# Patient Record
Sex: MALE | Race: WHITE | Employment: OTHER | URBAN - METROPOLITAN AREA
[De-identification: names, ages, dates, MRNs, and addresses within clinical notes are randomized per-mention and may not be internally consistent; named-entity substitution may affect disease eponyms.]

---

## 2017-01-04 NOTE — PATIENT DISCUSSION
Myopia Counseling: The diagnosis of myopia (nearsightedness) was discussed with the patient. I explained to the patient that people who are nearsighted may be at an increased risk of a retinal detachment. Possible symptoms of retinal detachment including new onset of significant floaters or flashes or a sudden decrease in vision were reviewed with the patient. The patient understands that any of these symptoms require an immediate call to the office for an examination that day. Options for the correction of the patient's myopia discussed may include glasses, contacts or elective refractive surgery. Return for follow-up as scheduled.

## 2017-01-04 NOTE — PATIENT DISCUSSION
Vitreous Floaters Counseling: I have discussed the diagnosis of vitreous floaters with the patient and the possibility of a posterior vitreous detachment (PVD), retinal tear or detachment. The signs/symptoms of a PVD, retinal tear or detachment were reviewed to include but not limited to redness, discharge, pain, increase or change in flashes of light, increase or change in floaters, decreased vision, part of the vision missing, veils or any other ocular concerns. I have further explained not all patients who develop a tear or detachment have notable symptoms, therefore, compliance with return visits are necessary. Return for follow-up as scheduled.

## 2017-01-04 NOTE — PATIENT DISCUSSION
*Dry Eye Syndrome Counseling: I have discussed the diagnosis and the pathophysiology of this disease with the patient. Vision may be limited by dry eye, and symptoms exacerbated by environmental factors such as smoke, wind, or prolonged eye use. Treatment options include, but are not limited to, artificial tears, punctal plugs, topical cyclosporine, oral omega-3 supplements, Lipiflow, moisture goggles, and lubricating ointments. I stressed the importance of compliance with treatment.

## 2017-01-04 NOTE — PATIENT DISCUSSION
*Glaucoma Suspect Counseling:  I have explained to the patient at length glaucoma potentially causes loss of peripheral vision due to damage to the optic nerve. I have explained that damage to the optic nerve from glaucoma is irreversible and that the available treatments for glaucoma are designed to prevent further damage if we determine glaucoma is present. I have explained the importance of regular follow-up with dilated eye exams to monitor for possible progression.

## 2017-01-04 NOTE — PATIENT DISCUSSION
MILD DRY EYE, OU: FINDINGS ON SLIT LAMP. NOT SYMPTOMATIC AT THIS TIME. IF SYMPTOMS ARISE PATIENT TO USE ARTIFICIAL TEARS PRN OU. RECOMMEND OMEGA-3 FISH OIL WITH PRIMARY CARE PHYSICIANS APPROVAL. RETURN FOR FOLLOW-UP AS SCHEDULED.

## 2017-01-04 NOTE — PATIENT DISCUSSION
VITREOUS FLOATERS, OU:  NO HOLES OR TEARS 360' OU. FLOATERS AND FLASHES PRECAUTIONS REVIEWED WITH PATIENT. PATIENT TO  RETURN FOR FOLLOW-UP AS SCHEDULED.

## 2017-08-07 ENCOUNTER — PREPPED CHART (OUTPATIENT)
Dept: URBAN - METROPOLITAN AREA CLINIC 36 | Facility: CLINIC | Age: 77
End: 2017-08-07

## 2017-08-07 ASSESSMENT — VISUAL ACUITY
OD_CC: J1+
OS_SC: J5
OS_SC: 20/25-1
OD_SC: J6
OD_SC: 20/20

## 2017-08-07 ASSESSMENT — TONOMETRY
OS_IOP_MMHG: 14
OD_IOP_MMHG: 14

## 2017-09-19 ENCOUNTER — ESTABLISHED COMPREHENSIVE EXAM (OUTPATIENT)
Dept: URBAN - METROPOLITAN AREA CLINIC 36 | Facility: CLINIC | Age: 77
End: 2017-09-19

## 2017-09-19 VITALS
SYSTOLIC BLOOD PRESSURE: 136 MMHG | DIASTOLIC BLOOD PRESSURE: 68 MMHG | HEIGHT: 60 IN | HEART RATE: 70 BPM | RESPIRATION RATE: 20 BRPM

## 2017-09-19 DIAGNOSIS — H31.012: ICD-10-CM

## 2017-09-19 DIAGNOSIS — H04.123: ICD-10-CM

## 2017-09-19 DIAGNOSIS — H43.391: ICD-10-CM

## 2017-09-19 DIAGNOSIS — B39.4: ICD-10-CM

## 2017-09-19 DIAGNOSIS — Z96.1: ICD-10-CM

## 2017-09-19 PROCEDURE — 1036F TOBACCO NON-USER: CPT

## 2017-09-19 PROCEDURE — G8752 SYS BP LESS 140: HCPCS

## 2017-09-19 PROCEDURE — 92014 COMPRE OPH EXAM EST PT 1/>: CPT

## 2017-09-19 PROCEDURE — G8754 DIAS BP LESS 90: HCPCS

## 2017-09-19 PROCEDURE — G8427 DOCREV CUR MEDS BY ELIG CLIN: HCPCS

## 2017-09-19 ASSESSMENT — TONOMETRY
OS_IOP_MMHG: 15
OD_IOP_MMHG: 14

## 2017-09-19 ASSESSMENT — VISUAL ACUITY
OS_CC: J1
OD_SC: J6
OS_SC: 20/25+2
OD_CC: J1
OS_SC: J4
OU_SC: 20/20
OD_SC: 20/20
OU_SC: J4

## 2018-01-31 NOTE — PATIENT DISCUSSION
Vitreomacular traction Counseling: I have discussed the diagnosis with the patient and the possibility of a retinal tear or detachment. The signs/symptoms of a retinal tear/detachment were reviewed to include but not limited to redness, discharge, pain, increase or change in flashes of light, increase or change in floaters, decreased vision, part of the vision missing, or veils. I have further explained not all patients who develop a tear or detachment have notable symptoms, therefore, compliance with return visits are necessary. The patient was instructed to contact us immediately if they noticed any of the signs or symptoms of retinal detachment as noted above as the prognosis for any potential treatment options may be time limited. Return for follow-up as scheduled.

## 2019-11-06 NOTE — PATIENT DISCUSSION
HORSESHOE TEAR, OS : OLD WITH STABLE LASER TREATMENT OF RETINAL BREAK. FOLLOW WITH RETINAL SPECIALIST AS SCHEDULED.

## 2022-11-28 ENCOUNTER — NEW PATIENT (OUTPATIENT)
Dept: URBAN - METROPOLITAN AREA CLINIC 36 | Facility: CLINIC | Age: 82
End: 2022-11-28

## 2022-11-28 DIAGNOSIS — H04.123: ICD-10-CM

## 2022-11-28 DIAGNOSIS — Z96.1: ICD-10-CM

## 2022-11-28 DIAGNOSIS — H31.012: ICD-10-CM

## 2022-11-28 DIAGNOSIS — B39.4: ICD-10-CM

## 2022-11-28 DIAGNOSIS — H52.7: ICD-10-CM

## 2022-11-28 PROCEDURE — 92004 COMPRE OPH EXAM NEW PT 1/>: CPT

## 2022-11-28 PROCEDURE — 92015 DETERMINE REFRACTIVE STATE: CPT

## 2022-11-28 ASSESSMENT — VISUAL ACUITY
OD_SC: J5
OD_SC: 20/20
OS_SC: J3
OS_SC: 20/40
OS_CC: J2
OD_CC: J1
OS_PH: 20/30

## 2022-11-28 ASSESSMENT — TONOMETRY
OS_IOP_MMHG: 16
OD_IOP_MMHG: 15

## 2023-12-21 NOTE — PATIENT DISCUSSION
Palliative Care Initial Consult   Attending Physician: Kiran Martin DO  Referring Provider: Kiran Martin    Reason for Referral:  assistance with clarification of goals of care, hospice referral or discussion, non-pain symptoms, and psychosocial support    Code Status:   Code Status and Medical Interventions:   Ordered at: 12/21/23 0923     Level Of Support Discussed With:    Patient     Code Status (Patient has no pulse and is not breathing):    No CPR (Do Not Attempt to Resuscitate)     Medical Interventions (Patient has pulse or is breathing):    Comfort Measures      Advanced Directives: Advance Directive Status: Patient has advance directive, copy in chart   Family/Support: spouse, siblings and children   Goals of Care: TBD.    HPI:   84 yo male admitted 12/19 from OSH after syncopal episodes at home and finding of AICD firing for Vtach.  Had had generator change  on 12/4.  Unfortunal=tely also developed A/CKD and now anuric.  Decided agains HD and is requesting to simply be kept comfortable.  This AM reports he is also SOA and nauseated.  Wants to have BM. K at 5.3 this AM.      ROS:   Constitutional: No fevers, chills, sweats, loss of appetite, weight loss  Eye:  No vision changes  ENMT:No ear pain, nasal congestion, sore throat  CV: No chest pain, LE edema,   Pulm: + shortness of breath, -cough, pain  GI:  + nausea, +vomiting, -diarrhea, constipation, melena, abdominal pain  Musculoskeletal:  No joint pain, swelling, stiffness  Integumentary:  No rashes, itching, wounds  Neuro: No weakness, numbness, tingling, speech changes, vision changes, swallowing difficulties  : no dysuria, hematuria, hesitancy, no UOP  Heme: No bruising tendency, swollen lymph glands  Psychiatric:  Denies sadness, anxiety, panic    Past Medical History:   Diagnosis Date    A-fib     Arthritis     Back pain     Cardiomyopathy     Non-ischemic    Coronary artery disease     2 stents    Diabetes mellitus     dx 3- 4 years ago-  [employer] [facility]: checks fsbs daily    Dizziness     Dyslipidemia     Hyperlipidemia     Hypertension     H/o    Kidney disease     HX- sees a nephrologist    Melanoma     Obesity     LOIS (obstructive sleep apnea)     cpap    PAF (paroxysmal atrial fibrillation) 08/21/2017    /atrial flutter    Palpitations     Tachy Palpitations    Psoriatic arthritis     Systolic heart failure     Chronic class II    Wears glasses     reading     Past Surgical History:   Procedure Laterality Date    CARDIAC ABLATION      CARDIAC CATHETERIZATION Left 2007    2 stents    CARDIAC ELECTROPHYSIOLOGY PROCEDURE N/A 9/27/2017    Procedure: Implant ICD - bi ventricular;  Surgeon: Evan Leroy DO;  Location:  [facility];  Service:     CARDIAC ELECTROPHYSIOLOGY PROCEDURE N/A 12/12/2019    Procedure: AV node ablation- hold coumadin one day;  Surgeon: Laci Wiley MD;  Location:  [facility];  Service: Cardiovascular    COLONOSCOPY      > 10 years ago    GALLBLADDER SURGERY      HIP SURGERY  2020    ICD GENERATOR REPLACEMENT N/A 12/4/2023    Procedure: ICD Generator Change - Kx-Xxoxiteictw-CTE-Hold Eliquis for 2 days;  Surgeon: Esteban Fitzgerald MD;  Location:  [facility];  Service: Cardiovascular;  Laterality: N/A;  Hx AVNA. Battery @ [facility] on 10/24/2023.    LIVER BIOPSY      x 3    OTHER SURGICAL HISTORY      Melanoma removal of back     PROSTATE BIOPSY      REPLACEMENT TOTAL KNEE BILATERAL Bilateral      Social History     Socioeconomic History    Marital status:    Tobacco Use    Smoking status: Never    Smokeless tobacco: Never   Vaping Use    Vaping Use: Never used   Substance and Sexual Activity    Alcohol use: No    Drug use: No    Sexual activity: Defer     Family History   Problem Relation Age of Onset    Cancer Mother     Heart attack Father        Allergies   Allergen Reactions    Ketamine Confusion    Codeine Other (See Comments)     Stomach pain       Current medication reviewed for route,  "type, dose and frequency and are current per MAR at time of dictation.    Palliative Performance Scale Score:      BP 97/76   Pulse 70   Temp 96.6 °F (35.9 °C) (Rectal)   Resp 20   Ht 177.8 cm (70\")   Wt 110 kg (242 lb)   SpO2 93%   BMI 34.72 kg/m²     Intake/Output Summary (Last 24 hours) at 12/21/2023 1018  Last data filed at 12/21/2023 0829  Gross per 24 hour   Intake 1090 ml   Output 60 ml   Net 1030 ml       Physical Exam:    General Appearance:    Alert, cooperative   HEENT:    NC/AT, EOMI, anicteric, MMM, brow furrowed   Neck:   supple, trachea midline, no JVD   Lungs:     CTA bilat, diminished in bases; respirations regular, even and unlabored; RR on exam    Heart:    RRR, normal S1 and S2, no M/R/G, hematoma over AICD   Abdomen:     Normal bowel sounds, soft, nontender, nondistended   G/U:   Deferred   MSK/Extremities:   Wasting, +edema   Pulses:   Pulses palpable and equal bilaterally   Skin:   Warm, dry, no mottling   Neurologic:   A/Ox3, cooperative, MCCORD   Psych:   Tearful at times         Labs:   Results from last 7 days   Lab Units 12/21/23  0422   WBC 10*3/mm3 4.76   HEMOGLOBIN g/dL 12.4*   HEMATOCRIT % 39.0   PLATELETS 10*3/mm3 201     Results from last 7 days   Lab Units 12/21/23  0818 12/21/23  0422   SODIUM mmol/L 121* 124*   POTASSIUM mmol/L  --  5.3*   CHLORIDE mmol/L  --  79*   CO2 mmol/L  --  23.0   BUN mg/dL  --  112*   CREATININE mg/dL  --  3.64*   GLUCOSE mg/dL  --  145*   CALCIUM mg/dL  --  8.4*     Results from last 7 days   Lab Units 12/21/23  0818 12/21/23  0422 12/19/23  0841 12/19/23  0316   SODIUM mmol/L 121* 124*   < > 126*   POTASSIUM mmol/L  --  5.3*   < > 3.5   CHLORIDE mmol/L  --  79*   < > 83*   CO2 mmol/L  --  23.0   < > 28.0   BUN mg/dL  --  112*   < > 63*   CREATININE mg/dL  --  3.64*   < > 2.15*   CALCIUM mg/dL  --  8.4*   < > 8.8   BILIRUBIN mg/dL  --   --   --  1.1   ALK PHOS U/L  --   --   --  85   ALT (SGPT) U/L  --   --   --  13   AST (SGOT) U/L  --   --   --  " 19   GLUCOSE mg/dL  --  145*   < > 269*    < > = values in this interval not displayed.     Imaging Results (Last 72 Hours)       Procedure Component Value Units Date/Time    XR Chest 1 View [[id number]] Collected: 12/21/23 0744     Updated: 12/21/23 0747    Narrative:      XR CHEST 1 VW    Date of Exam: 12/21/2023 6:18 AM CST    Indication: ?PN    Comparison: Chest x-ray 12/20/2023    Findings:  The heart is stable in size. There is a cardiac pacemaker. Left basilar airspace disease and underlying effusion does not appear significantly changed. No evidence for pneumothorax.      Impression:      Impression:  Stable exam with left basilar airspace disease and mild effusion.      Electronically Signed: Afia Alva MD    12/21/2023 6:44 AM CST    Workstation ID: [id number]    US Renal Limited [[id number]] Collected: 12/21/23 0743     Updated: 12/21/23 0747    Narrative:      US RENAL LIMITED    Date of Exam: 12/21/2023 5:00 AM EST    Indication: renal failure.    Comparison: No comparisons available.    Technique: Grayscale and color Doppler ultrasound evaluation of the kidneys and urinary bladder was performed.      Findings:  The right kidney measures 10.4 cm and the left kidney measures 10.4 cm. Kidney echogenicity appears within normal limits. Diffuse bilateral renal cortical atrophy. There is no solid kidney mass.  No echogenic shadowing stone.  No hydronephrosis.      Bladder decompressed not well evaluated        Impression:      Impression:  Bilateral renal cortical atrophy. Otherwise unremarkable renal ultrasound        Electronically Signed: Mateo Roberto MD    12/21/2023 7:44 AM EST    Workstation ID: [id number]    XR Chest 1 View [[id number]] Collected: 12/20/23 1536     Updated: 12/20/23 1540    Narrative:      XR CHEST 1 VW    Date of Exam: 12/20/2023 3:09 PM EST    Indication: acute hypoxia    Comparison: 12/19/2023    Findings:  There is cardiomegaly. Small left pleural effusion with mild left basilar  opacities. The right lung is clear. The right costophrenic angle is sharp. There is a multilead left pacer device      Impression:      Impression:  Cardiomegaly    Small left pleural effusion    Mild left basilar atelectasis or infiltrate      Electronically Signed: Mateo Roberto MD    12/20/2023 3:37 PM EST    Workstation ID: [id number]    XR Chest 1 View [[id number]] Collected: 12/19/23 0532     Updated: 12/19/23 0535    Narrative:      XR CHEST 1 VW    Date of Exam: 12/19/2023 5:25 AM EST    Indication: possible CHF, chest pain    Comparison: 9/28/2017.    Findings:  There are no airspace consolidations. No pleural fluid. No pneumothorax. The pulmonary vasculature appears within normal limits. The heart appears enlarged. There is a stable left subclavian ICD/pacemaker device. Unremarkable. No acute osseous   abnormality identified.      Impression:      Impression:  No acute cardiopulmonary process. Stable cardiomegaly.        Electronically Signed: Tiesha Salinas MD    12/19/2023 5:32 AM EST    Workstation ID: [id number]              Lab 12/19/23  0316   HEMOGLOBIN A1C 9.80*       Diagnostics: Reviewed    A:     Syncope    Cardiomyopathy    Hypertension    Dyslipidemia    Chronic systolic congestive heart failure    Morbidly obese    LOIS (obstructive sleep apnea)    PAF (paroxysmal atrial fibrillation)    Biventricular automatic implantable cardioverter defibrillator in situ    Type 2 diabetes mellitus with chronic kidney disease, without long-term current use of insulin         Impression:  Syncope  HFrEF  HTN  Obesity  DM 2 - A1c 9.8  AICD in situ  Hyponatremia    Symptoms:  Dyspnea  Debility  Nausea  Vomiting  Anxiety    P:    Met with pt and brother.  Pt is decision and reports he does not want HD, does not want intubation or resuscitation.  States he just wants to be comfortable.  Discussed prognosis of hours to days.  Discussed with RN and adjusted orders.  Hospitalist updated.  Hospice referral made. Thank  you for this consult and allowing us to participate in patient's plan of care. Palliative Care Team will continue to follow patient.     Time spent:66 minutes spent reviewing medical and medication records, assessing and examining patient, discussing with family, answering questions, providing some guidance about a plan and documentation of care, and coordinating care with other healthcare members, with > 50% time spent face to face.     Yulissa Ozuna MD  12/21/2023